# Patient Record
Sex: MALE | Race: ASIAN | NOT HISPANIC OR LATINO | URBAN - METROPOLITAN AREA
[De-identification: names, ages, dates, MRNs, and addresses within clinical notes are randomized per-mention and may not be internally consistent; named-entity substitution may affect disease eponyms.]

---

## 2023-06-10 ENCOUNTER — INPATIENT (INPATIENT)
Facility: HOSPITAL | Age: 43
LOS: 0 days | Discharge: HOME | DRG: 446 | End: 2023-06-11
Attending: HOSPITALIST | Admitting: HOSPITALIST
Payer: COMMERCIAL

## 2023-06-10 VITALS
HEART RATE: 103 BPM | OXYGEN SATURATION: 96 % | HEIGHT: 67 IN | DIASTOLIC BLOOD PRESSURE: 92 MMHG | RESPIRATION RATE: 18 BRPM | SYSTOLIC BLOOD PRESSURE: 144 MMHG | WEIGHT: 184.97 LBS | TEMPERATURE: 99 F

## 2023-06-10 DIAGNOSIS — Z90.49 ACQUIRED ABSENCE OF OTHER SPECIFIED PARTS OF DIGESTIVE TRACT: Chronic | ICD-10-CM

## 2023-06-10 DIAGNOSIS — R10.9 UNSPECIFIED ABDOMINAL PAIN: ICD-10-CM

## 2023-06-10 LAB
ALBUMIN SERPL ELPH-MCNC: 4.1 G/DL — SIGNIFICANT CHANGE UP (ref 3.3–5)
ALP SERPL-CCNC: 244 U/L — HIGH (ref 40–120)
ALT FLD-CCNC: 475 U/L — HIGH (ref 10–45)
ANION GAP SERPL CALC-SCNC: 14 MMOL/L — SIGNIFICANT CHANGE UP (ref 5–17)
AST SERPL-CCNC: 366 U/L — HIGH (ref 10–40)
BASE EXCESS BLDV CALC-SCNC: 2.1 MMOL/L — SIGNIFICANT CHANGE UP (ref -2–3)
BASOPHILS # BLD AUTO: 0.03 K/UL — SIGNIFICANT CHANGE UP (ref 0–0.2)
BASOPHILS NFR BLD AUTO: 0.7 % — SIGNIFICANT CHANGE UP (ref 0–2)
BILIRUB SERPL-MCNC: 1.1 MG/DL — SIGNIFICANT CHANGE UP (ref 0.2–1.2)
BUN SERPL-MCNC: 9 MG/DL — SIGNIFICANT CHANGE UP (ref 7–23)
CA-I SERPL-SCNC: 1.23 MMOL/L — SIGNIFICANT CHANGE UP (ref 1.15–1.33)
CALCIUM SERPL-MCNC: 9.1 MG/DL — SIGNIFICANT CHANGE UP (ref 8.4–10.5)
CHLORIDE BLDV-SCNC: 101 MMOL/L — SIGNIFICANT CHANGE UP (ref 96–108)
CHLORIDE SERPL-SCNC: 102 MMOL/L — SIGNIFICANT CHANGE UP (ref 96–108)
CO2 BLDV-SCNC: 29 MMOL/L — HIGH (ref 22–26)
CO2 SERPL-SCNC: 20 MMOL/L — LOW (ref 22–31)
CREAT SERPL-MCNC: 0.68 MG/DL — SIGNIFICANT CHANGE UP (ref 0.5–1.3)
EGFR: 118 ML/MIN/1.73M2 — SIGNIFICANT CHANGE UP
EOSINOPHIL # BLD AUTO: 0.1 K/UL — SIGNIFICANT CHANGE UP (ref 0–0.5)
EOSINOPHIL NFR BLD AUTO: 2.3 % — SIGNIFICANT CHANGE UP (ref 0–6)
GAS PNL BLDV: 134 MMOL/L — LOW (ref 136–145)
GAS PNL BLDV: SIGNIFICANT CHANGE UP
GLUCOSE BLDC GLUCOMTR-MCNC: 133 MG/DL — HIGH (ref 70–99)
GLUCOSE BLDV-MCNC: 271 MG/DL — HIGH (ref 70–99)
GLUCOSE SERPL-MCNC: 256 MG/DL — HIGH (ref 70–99)
HCO3 BLDV-SCNC: 28 MMOL/L — SIGNIFICANT CHANGE UP (ref 22–29)
HCT VFR BLD CALC: 44.6 % — SIGNIFICANT CHANGE UP (ref 39–50)
HCT VFR BLDA CALC: 48 % — SIGNIFICANT CHANGE UP (ref 39–51)
HGB BLD CALC-MCNC: 16.1 G/DL — SIGNIFICANT CHANGE UP (ref 12.6–17.4)
HGB BLD-MCNC: 15.4 G/DL — SIGNIFICANT CHANGE UP (ref 13–17)
IMM GRANULOCYTES NFR BLD AUTO: 0.2 % — SIGNIFICANT CHANGE UP (ref 0–0.9)
LACTATE BLDV-MCNC: 1.4 MMOL/L — SIGNIFICANT CHANGE UP (ref 0.5–2)
LIDOCAIN IGE QN: 26 U/L — SIGNIFICANT CHANGE UP (ref 7–60)
LYMPHOCYTES # BLD AUTO: 1.56 K/UL — SIGNIFICANT CHANGE UP (ref 1–3.3)
LYMPHOCYTES # BLD AUTO: 35.8 % — SIGNIFICANT CHANGE UP (ref 13–44)
MCHC RBC-ENTMCNC: 28.3 PG — SIGNIFICANT CHANGE UP (ref 27–34)
MCHC RBC-ENTMCNC: 34.5 GM/DL — SIGNIFICANT CHANGE UP (ref 32–36)
MCV RBC AUTO: 81.8 FL — SIGNIFICANT CHANGE UP (ref 80–100)
MONOCYTES # BLD AUTO: 0.38 K/UL — SIGNIFICANT CHANGE UP (ref 0–0.9)
MONOCYTES NFR BLD AUTO: 8.7 % — SIGNIFICANT CHANGE UP (ref 2–14)
NEUTROPHILS # BLD AUTO: 2.28 K/UL — SIGNIFICANT CHANGE UP (ref 1.8–7.4)
NEUTROPHILS NFR BLD AUTO: 52.3 % — SIGNIFICANT CHANGE UP (ref 43–77)
NRBC # BLD: 0 /100 WBCS — SIGNIFICANT CHANGE UP (ref 0–0)
PCO2 BLDV: 46 MMHG — SIGNIFICANT CHANGE UP (ref 42–55)
PH BLDV: 7.39 — SIGNIFICANT CHANGE UP (ref 7.32–7.43)
PLATELET # BLD AUTO: 200 K/UL — SIGNIFICANT CHANGE UP (ref 150–400)
PO2 BLDV: 25 MMHG — SIGNIFICANT CHANGE UP (ref 25–45)
POTASSIUM BLDV-SCNC: 4.2 MMOL/L — SIGNIFICANT CHANGE UP (ref 3.5–5.1)
POTASSIUM SERPL-MCNC: 4.6 MMOL/L — SIGNIFICANT CHANGE UP (ref 3.5–5.3)
POTASSIUM SERPL-SCNC: 4.6 MMOL/L — SIGNIFICANT CHANGE UP (ref 3.5–5.3)
PROT SERPL-MCNC: 7.3 G/DL — SIGNIFICANT CHANGE UP (ref 6–8.3)
RBC # BLD: 5.45 M/UL — SIGNIFICANT CHANGE UP (ref 4.2–5.8)
RBC # FLD: 13.4 % — SIGNIFICANT CHANGE UP (ref 10.3–14.5)
SAO2 % BLDV: 51.1 % — LOW (ref 67–88)
SODIUM SERPL-SCNC: 136 MMOL/L — SIGNIFICANT CHANGE UP (ref 135–145)
WBC # BLD: 4.36 K/UL — SIGNIFICANT CHANGE UP (ref 3.8–10.5)
WBC # FLD AUTO: 4.36 K/UL — SIGNIFICANT CHANGE UP (ref 3.8–10.5)

## 2023-06-10 PROCEDURE — 99222 1ST HOSP IP/OBS MODERATE 55: CPT

## 2023-06-10 PROCEDURE — 78227 HEPATOBIL SYST IMAGE W/DRUG: CPT | Mod: 26,MA

## 2023-06-10 PROCEDURE — 99285 EMERGENCY DEPT VISIT HI MDM: CPT

## 2023-06-10 PROCEDURE — 74177 CT ABD & PELVIS W/CONTRAST: CPT | Mod: 26

## 2023-06-10 PROCEDURE — 76705 ECHO EXAM OF ABDOMEN: CPT | Mod: 26

## 2023-06-10 RX ORDER — SODIUM CHLORIDE 9 MG/ML
1000 INJECTION, SOLUTION INTRAVENOUS
Refills: 0 | Status: DISCONTINUED | OUTPATIENT
Start: 2023-06-10 | End: 2023-06-11

## 2023-06-10 RX ORDER — DEXTROSE 50 % IN WATER 50 %
12.5 SYRINGE (ML) INTRAVENOUS ONCE
Refills: 0 | Status: DISCONTINUED | OUTPATIENT
Start: 2023-06-10 | End: 2023-06-11

## 2023-06-10 RX ORDER — SODIUM CHLORIDE 9 MG/ML
1000 INJECTION INTRAMUSCULAR; INTRAVENOUS; SUBCUTANEOUS ONCE
Refills: 0 | Status: COMPLETED | OUTPATIENT
Start: 2023-06-10 | End: 2023-06-10

## 2023-06-10 RX ORDER — DEXTROSE 50 % IN WATER 50 %
15 SYRINGE (ML) INTRAVENOUS ONCE
Refills: 0 | Status: DISCONTINUED | OUTPATIENT
Start: 2023-06-10 | End: 2023-06-11

## 2023-06-10 RX ORDER — ACETAMINOPHEN 500 MG
1000 TABLET ORAL ONCE
Refills: 0 | Status: COMPLETED | OUTPATIENT
Start: 2023-06-10 | End: 2023-06-10

## 2023-06-10 RX ORDER — INSULIN LISPRO 100/ML
VIAL (ML) SUBCUTANEOUS
Refills: 0 | Status: DISCONTINUED | OUTPATIENT
Start: 2023-06-10 | End: 2023-06-11

## 2023-06-10 RX ORDER — INSULIN LISPRO 100/ML
VIAL (ML) SUBCUTANEOUS AT BEDTIME
Refills: 0 | Status: DISCONTINUED | OUTPATIENT
Start: 2023-06-10 | End: 2023-06-11

## 2023-06-10 RX ORDER — DEXTROSE 50 % IN WATER 50 %
25 SYRINGE (ML) INTRAVENOUS ONCE
Refills: 0 | Status: DISCONTINUED | OUTPATIENT
Start: 2023-06-10 | End: 2023-06-11

## 2023-06-10 RX ORDER — GLUCAGON INJECTION, SOLUTION 0.5 MG/.1ML
1 INJECTION, SOLUTION SUBCUTANEOUS ONCE
Refills: 0 | Status: DISCONTINUED | OUTPATIENT
Start: 2023-06-10 | End: 2023-06-11

## 2023-06-10 RX ORDER — HEPARIN SODIUM 5000 [USP'U]/ML
5000 INJECTION INTRAVENOUS; SUBCUTANEOUS EVERY 8 HOURS
Refills: 0 | Status: DISCONTINUED | OUTPATIENT
Start: 2023-06-10 | End: 2023-06-11

## 2023-06-10 RX ORDER — ONDANSETRON 8 MG/1
4 TABLET, FILM COATED ORAL ONCE
Refills: 0 | Status: COMPLETED | OUTPATIENT
Start: 2023-06-10 | End: 2023-06-10

## 2023-06-10 RX ORDER — ACETAMINOPHEN 500 MG
650 TABLET ORAL EVERY 6 HOURS
Refills: 0 | Status: DISCONTINUED | OUTPATIENT
Start: 2023-06-10 | End: 2023-06-11

## 2023-06-10 RX ADMIN — SODIUM CHLORIDE 1000 MILLILITER(S): 9 INJECTION INTRAMUSCULAR; INTRAVENOUS; SUBCUTANEOUS at 16:43

## 2023-06-10 RX ADMIN — SODIUM CHLORIDE 1000 MILLILITER(S): 9 INJECTION INTRAMUSCULAR; INTRAVENOUS; SUBCUTANEOUS at 17:55

## 2023-06-10 NOTE — H&P ADULT - PROBLEM SELECTOR PLAN 1
- Gallstones and sludge on US, CT  - CT w/ c/f GB wall thickening   - Suspect elevated liver enzymes i/s/o stone that has passed  - Given overall image, will consult surgery for possible cholecystectomy  - Keep NPO  - Trend liver enzymes in AM  - f/u hepatitis panel

## 2023-06-10 NOTE — ED PROVIDER NOTE - PROGRESS NOTE DETAILS
Attending MD Srivastava: US noted, CT ordered, if nonactionable, will consider CDU for HIDA contacted Radiology who will contact the on call [facility] med technician - Sofía Madera PA-C patient and family updated on results thus far and plan - Sofía Madera PA-C Attending MD Srivastava: Discussed risks, benefits of HIDA, explained better test for biliary pathology than CT.  Reports concerned because he had "something injected into his body once" and he felt his heart racing.  Patient amenable to HIDA. Attending MD Srivastava: Spoke with patient's wife, explained results of studies, explained transaminitis, will admit.  Amenable.  Discussed with hospitalist, admitted.

## 2023-06-10 NOTE — H&P ADULT - NSHPLABSRESULTS_GEN_ALL_CORE
LABS:                      15.4   4.36  )-----------( 200      ( 10 Miquel 2023 15:39 )             44.6     06-10    136  |  102  |  9   ----------------------------<  256<H>  4.6   |  20<L>  |  0.68    Ca    9.1      10 Miquel 2023 15:39    TPro  7.3  /  Alb  4.1  /  TBili  1.1  /  DBili  x   /  AST  366<H>  /  ALT  475<H>  /  AlkPhos  244<H>  06-10    LIVER FUNCTIONS - ( 10 Miquel 2023 15:39 )  Alb: 4.1 g/dL / Pro: 7.3 g/dL / ALK PHOS: 244 U/L / ALT: 475 U/L / AST: 366 U/L / GGT: x           IMAGING:  CT Abdomen and Pelvis w/ IV Cont (06.10.23 @ 23:19)  IMPRESSION:  - Mild wall thickening of the gallbladder, without cholelithiasis. Given negative results of both recent sonogram and hepatobiliary scan, these findings are of uncertain nature.    NM Hepatobiliary Imaging w/ RX (06.10.23 @ 00:00)  IMPRESSION:   - Normal morphine-augmented hepatobiliary scan.  - No evidence of acute cholecystitis or biliary obstruction.    US Abdomen Upper Quadrant Right (06.10.23 @ 16:31)  IMPRESSION:  - Gallstones and sludge without evidence of acute cholecystitis.    [X] Imaging personally reviewed by me- GB wall thickening and stone noted  [X] ECG personally reviewed by me- NSR w/out acute ischemic changes

## 2023-06-10 NOTE — H&P ADULT - ASSESSMENT
41yo M w/ PMH of HLD, DM2 pw RUQ abd pain and N/V and elevated liver enzymes. Overall image c/f gallstone disease

## 2023-06-10 NOTE — H&P ADULT - HISTORY OF PRESENT ILLNESS
Pt is a 43yo M w/ PMH of HLD, DM2 pw RUQ abd pain and N/V.    Pain is "burning" and gaslike in nature, intermittent and worse w/ eating. States similar symptoms happened about 1 month prior after eating for which he presented to his PMD and dx w/ gallstones. At that time pt advised to limit fatty food intake and monitor for recurrence of symptoms. On night prior to presentation pt endorses RUQ and epigastric pain a/w N/V similar to prior episode, prompting his ED visit.     Denies any F/C, jaundice, diarrhea, melena, or hematochezia.    In the ED VSS w/ labs notable for elevated liver enzymes and imaging showing gallstones and sludge within the gallbladder and HIDA negative for acute ricardo. He was administered 2L IVF and Zofran. Currently pt is well appearing and asymptomatic.

## 2023-06-10 NOTE — ED ADULT NURSE REASSESSMENT NOTE - NS ED NURSE REASSESS COMMENT FT1
Pt at [facility], as per technician, pt received 4 mg morphine for testing purposes, pre /93, post /87.
Pt is awake, alert, and speaking in full coherent sentences. Vital signs stable. Pt resting comfortably in stretcher, side rails up and bed in lowest position. Pt states his abdominal pain decreased to 2/10 pain. Pt's wife at bedside. Pt awaiting test results.
Pt resting in stretcher, back from nuclear medicine test, awaiting results. Pt denies pain at this time, VSS. Wheels locked, bed in lowest position.

## 2023-06-10 NOTE — H&P ADULT - NSHPPHYSICALEXAM_GEN_ALL_CORE
Vital Signs Last 24 Hrs  T(C): 36.8 (10 Miquel 2023 22:29), Max: 37 (10 Miquel 2023 12:44)  T(F): 98.3 (10 Miquel 2023 22:29), Max: 98.6 (10 Miquel 2023 12:44)  HR: 92 (10 Miquel 2023 22:29) (78 - 103)  BP: 148/94 (10 Miquel 2023 22:29) (144/92 - 155/99)  BP(mean): 109 (10 Miquel 2023 17:38) (109 - 109)  RR: 19 (10 Miquel 2023 22:29) (18 - 20)  SpO2: 98% (10 Miquel 2023 22:29) (96% - 100%)    CONSTITUTIONAL: Well-groomed, in no apparent distress  EYES: No conjunctival or scleral injection, non-icteric;   ENMT: No external nasal lesions; MMM  NECK: Trachea midline without palpable neck mass; thyroid not enlarged and non-tender  RESPIRATORY: Breathing comfortably; no dullness to percussion; lungs CTA without wheeze/rhonchi/rales  CARDIOVASCULAR: +S1S2, RRR, no M/G/R; pedal pulses full and symmetric; no lower extremity edema  GASTROINTESTINAL: No palpable masses or tenderness, +BS throughout, no rebound/guarding; no hepatosplenomegaly; no hernia palpated  LYMPHATIC: No cervical LAD or tenderness  SKIN: No rashes or ulcers noted  NEUROLOGIC: CN II-XII intact; sensation intact in LEs b/l to light touch  PSYCHIATRIC: A&Ox3; mood and affect appropriate; appropriate insight and judgment

## 2023-06-10 NOTE — ED PROVIDER NOTE - OBJECTIVE STATEMENT
42-year-old male hx DM accompanied by family member who translates per their request presenting to the emergency department for the evaluation of waxing and waning epigastric and right upper quadrant pain.  Patient had a similar episode about a month ago and went to see primary care doctor and was diagnosed with gallstones at that time.  Patient had pain controlled and then last night began having sudden onset of nausea and vomiting with return of pain.  No fever or chills.  No diarrhea.  No urinary complaints.  Past surgical history significant for appendectomy many years ago. no etoh. 42-year-old male hx DM accompanied by family member who translates per their request presenting to the emergency department for the evaluation of waxing and waning epigastric and right upper quadrant pain.  Patient had a similar episode about a month ago and went to see primary care doctor and was diagnosed with gallstones at that time.  pain is similar in nature now. worse with eating. Patient had pain controlled and then last night began having sudden onset of nausea and vomiting with return of pain.  No fever or chills.  No diarrhea.  No urinary complaints.  Past surgical history significant for appendectomy many years ago. no etoh.

## 2023-06-10 NOTE — ED ADULT NURSE NOTE - OBJECTIVE STATEMENT
42Y M AXO 3 PMH of gallstones and DM and PSH of appendectomy presented to the ED c/o abdominal pain worsening today. Pt's relative reports pt had a similar episode approx 1 month ago and outpatient provider diagnosed pt with "gallstones" and advised pt to improve on diet. Pt states abdominal pain worsened yesterday and experienced a new onset of nausea and vomiting. Upon arrival to the ED, the pt is well appearing, has bilateral, even and unlabored chest rise, and ambulatory with steady gait. Upon assessment, pt has even and bilateral peripheral pulses, even and bilateral strength, ROM, and soft,, non-distended abdomen. Tenderness noted upon palpation of R and L upper abdominal quadrants and epigastric area. Pt denies fevers, chest pain, SOB, diarrhea, lightheadedness, dizziness, headaches, numbness and tingling of extremities, urinary symptoms, and black or bloody stools. Comfort and safety provided, IV access obtained, bed in lowest position and side rails up.

## 2023-06-10 NOTE — ED PROVIDER NOTE - ATTENDING APP SHARED VISIT CONTRIBUTION OF CARE
Attending MD Srivastava:   I personally have seen and examined this patient.  Physician assistant note reviewed and agree on plan of care and except where noted.  See below for details.     Seen in [place], accompanied by wife, Edwina  Laila [phone]    42M with PMH/PSH including DM, s/p appendectomy (not recent) presents to the ED with upper abdominal pain, RUQ and epigastrium extending now to LUQ.  Reports pain started about a month ago and was evaluated by his PMD and was told that he had gallstones.  Reports that since then has been having intermittent abdominal pain.  Reports that the pain is aggravated by po intake, reports starts about 10-15 min after po intake.  Reports last night had nausea, vomiting, nonbloody, nonbilious.  Denies fevers, chills.  Denies diarrhea, bloody or black stools.  Denies chest pain, shortness of breath.  Denies EtOH.    Exam:   General: NAD  HENT: head NCAT, airway patent  Eyes: anicteric, no conjunctival injection   Lungs: lungs CTAB with good inspiratory effort, no wheezing, no rhonchi, no rales  Cardiac: +S1S2, no obvious m/r/g  GI: abdomen soft with +BS, +epigastric and RUQ tenderness, +Cochran's, ND  : no CVAT  MSK: ranging neck and extremities freely  Neuro: moving all extremities spontaneously, nonfocal  Psych: normal mood and affect     A/P: 42M with RUQ/epigastric pain, suspect biliary pathology, such as cholecystitis, will also consider pancreatitis, less likely gastritis, will obtain RUQ US, labs, if non revelatory, will consider CTAP, HIDA, will give analgesia, keep npo, IVFs, Attending MD Srivastava:   I personally have seen and examined this patient.  Physician assistant note reviewed and agree on plan of care and except where noted.  See below for details.     Seen in [place], accompanied by wife, Edwina  Laila [phone]  Wife did not want , reports she speaks English and would interpret,  remained on line but wife provided interpretation.    42M with PMH/PSH including DM, s/p appendectomy (not recent) presents to the ED with upper abdominal pain, RUQ and epigastrium extending now to LUQ.  Reports pain started about a month ago and was evaluated by his PMD and was told that he had gallstones.  Reports that since then has been having intermittent abdominal pain.  Reports that the pain is aggravated by po intake, reports starts about 10-15 min after po intake.  Reports last night had nausea, vomiting, nonbloody, nonbilious.  Denies fevers, chills.  Denies diarrhea, bloody or black stools.  Denies chest pain, shortness of breath.  Denies EtOH.    Exam:   General: NAD  HENT: head NCAT, airway patent  Eyes: anicteric, no conjunctival injection   Lungs: lungs CTAB with good inspiratory effort, no wheezing, no rhonchi, no rales  Cardiac: +S1S2, no obvious m/r/g  GI: abdomen soft with +BS, +epigastric and RUQ tenderness, +Cochran's, ND  : no CVAT  MSK: ranging neck and extremities freely  Neuro: moving all extremities spontaneously, nonfocal  Psych: normal mood and affect     A/P: 42M with RUQ/epigastric pain, suspect biliary pathology, such as cholecystitis, will also consider pancreatitis, less likely gastritis, will obtain RUQ US, labs, if non revelatory, will consider CTAP, HIDA, will give analgesia, keep npo, IVFs,

## 2023-06-10 NOTE — H&P ADULT - PROBLEM SELECTOR PLAN 2
- On home Insulin 36U qHS and Glimepiride 4mg   - f/u AM A1c  - Start KASH q6hrs while NPO  - Diabetic education  - CC diet when appropriate

## 2023-06-10 NOTE — ED ADULT NURSE NOTE - NSFALLUNIVINTERV_ED_ALL_ED
Bed/Stretcher in lowest position, wheels locked, appropriate side rails in place/Call bell, personal items and telephone in reach/Instruct patient to call for assistance before getting out of bed/chair/stretcher/Non-slip footwear applied when patient is off stretcher/[place] to call system/Physically safe environment - no spills, clutter or unnecessary equipment/Purposeful proactive rounding/Room/bathroom lighting operational, light cord in reach

## 2023-06-10 NOTE — H&P ADULT - NSHPREVIEWOFSYSTEMS_GEN_ALL_CORE
CONSTITUTIONAL: No fever, weight loss  EYES: No eye pain, visual disturbances, or discharge  ENMT: No difficulty hearing, tinnitus, vertigo; No sinus or throat pain  RESPIRATORY: No SOB. No cough, wheezing, chills or hemoptysis  CARDIOVASCULAR: No chest pain, palpitations, dizziness, or leg swelling  GASTROINTESTINAL: No abdominal or epigastric pain. No nausea, vomiting, or hematemesis; No diarrhea or constipation. No melena or hematochezia.  GENITOURINARY: No dysuria, frequency, hematuria, or incontinence  NEUROLOGICAL: No headaches, memory loss, loss of strength, numbness, or tremors  SKIN: No itching, burning, rashes, or lesions   LYMPH NODES: No enlarged glands  ENDOCRINE: No heat or cold intolerance; No hair loss  MUSCULOSKELETAL: No joint pain or swelling; No muscle, back pain  PSYCHIATRIC: No depression, anxiety, mood swings, or difficulty sleeping  HEME/LYMPH: No easy bruising, or bleeding gums

## 2023-06-11 VITALS
SYSTOLIC BLOOD PRESSURE: 149 MMHG | HEART RATE: 98 BPM | DIASTOLIC BLOOD PRESSURE: 97 MMHG | RESPIRATION RATE: 18 BRPM | TEMPERATURE: 98 F | OXYGEN SATURATION: 98 %

## 2023-06-11 DIAGNOSIS — E11.9 TYPE 2 DIABETES MELLITUS WITHOUT COMPLICATIONS: ICD-10-CM

## 2023-06-11 DIAGNOSIS — Z29.9 ENCOUNTER FOR PROPHYLACTIC MEASURES, UNSPECIFIED: ICD-10-CM

## 2023-06-11 DIAGNOSIS — K80.20 CALCULUS OF GALLBLADDER WITHOUT CHOLECYSTITIS WITHOUT OBSTRUCTION: ICD-10-CM

## 2023-06-11 DIAGNOSIS — E78.5 HYPERLIPIDEMIA, UNSPECIFIED: ICD-10-CM

## 2023-06-11 LAB
GLUCOSE BLDC GLUCOMTR-MCNC: 126 MG/DL — HIGH (ref 70–99)
GLUCOSE BLDC GLUCOMTR-MCNC: 245 MG/DL — HIGH (ref 70–99)

## 2023-06-11 PROCEDURE — 99285 EMERGENCY DEPT VISIT HI MDM: CPT

## 2023-06-11 PROCEDURE — 80053 COMPREHEN METABOLIC PANEL: CPT

## 2023-06-11 PROCEDURE — 85018 HEMOGLOBIN: CPT

## 2023-06-11 PROCEDURE — 83605 ASSAY OF LACTIC ACID: CPT

## 2023-06-11 PROCEDURE — 74177 CT ABD & PELVIS W/CONTRAST: CPT | Mod: MA

## 2023-06-11 PROCEDURE — 76705 ECHO EXAM OF ABDOMEN: CPT

## 2023-06-11 PROCEDURE — 85025 COMPLETE CBC W/AUTO DIFF WBC: CPT

## 2023-06-11 PROCEDURE — 78227 HEPATOBIL SYST IMAGE W/DRUG: CPT | Mod: MA

## 2023-06-11 PROCEDURE — 82435 ASSAY OF BLOOD CHLORIDE: CPT

## 2023-06-11 PROCEDURE — 93005 ELECTROCARDIOGRAM TRACING: CPT

## 2023-06-11 PROCEDURE — 82962 GLUCOSE BLOOD TEST: CPT

## 2023-06-11 PROCEDURE — A9537: CPT

## 2023-06-11 PROCEDURE — 82330 ASSAY OF CALCIUM: CPT

## 2023-06-11 PROCEDURE — 85014 HEMATOCRIT: CPT

## 2023-06-11 PROCEDURE — 99233 SBSQ HOSP IP/OBS HIGH 50: CPT

## 2023-06-11 PROCEDURE — 82803 BLOOD GASES ANY COMBINATION: CPT

## 2023-06-11 PROCEDURE — 83690 ASSAY OF LIPASE: CPT

## 2023-06-11 PROCEDURE — 84132 ASSAY OF SERUM POTASSIUM: CPT

## 2023-06-11 PROCEDURE — 82947 ASSAY GLUCOSE BLOOD QUANT: CPT

## 2023-06-11 PROCEDURE — 84295 ASSAY OF SERUM SODIUM: CPT

## 2023-06-11 RX ORDER — SIMVASTATIN 20 MG/1
1 TABLET, FILM COATED ORAL
Refills: 0 | DISCHARGE

## 2023-06-11 RX ORDER — INSULIN GLARGINE 100 [IU]/ML
36 INJECTION, SOLUTION SUBCUTANEOUS
Refills: 0 | DISCHARGE

## 2023-06-11 RX ORDER — SODIUM CHLORIDE 9 MG/ML
1000 INJECTION, SOLUTION INTRAVENOUS
Refills: 0 | Status: DISCONTINUED | OUTPATIENT
Start: 2023-06-11 | End: 2023-06-11

## 2023-06-11 RX ORDER — GLIMEPIRIDE 1 MG
1 TABLET ORAL
Refills: 0 | DISCHARGE

## 2023-06-11 RX ORDER — ATORVASTATIN CALCIUM 80 MG/1
40 TABLET, FILM COATED ORAL AT BEDTIME
Refills: 0 | Status: DISCONTINUED | OUTPATIENT
Start: 2023-06-11 | End: 2023-06-11

## 2023-06-11 RX ORDER — INSULIN LISPRO 100/ML
VIAL (ML) SUBCUTANEOUS EVERY 6 HOURS
Refills: 0 | Status: DISCONTINUED | OUTPATIENT
Start: 2023-06-11 | End: 2023-06-11

## 2023-06-11 RX ADMIN — Medication 2: at 11:47

## 2023-06-11 RX ADMIN — SODIUM CHLORIDE 125 MILLILITER(S): 9 INJECTION, SOLUTION INTRAVENOUS at 11:48

## 2023-06-11 NOTE — CONSULT NOTE ADULT - ASSESSMENT
42M w PMHx DM, HLD, SHx Laparoscopic appendectomy who presents with acute onset RUQ pain aw nausea/vomiting. Surgery consulted for evaluation. Overall picture consistent with sx cholelithiasis given stones/sludge, but benign abd exam and (-) HIDA.     Plan:   -No acute surgical intervention  -Re-Eval in AM, if pain persists will add on for OR  -If pain improves, can dispo per primary team w close follow up with [facility] surgery for interval ricardo   -Surgery to follow     Discussed with Attending Surgeon Dr. Alex Calderon MD PGY2  ACS   [id number]   42M w PMHx DM, HLD, SHx Laparoscopic appendectomy who presents with acute onset RUQ pain aw nausea/vomiting. Surgery consulted for evaluation. Overall picture consistent with sx cholelithiasis given stones/sludge, but benign abd exam and (-) HIDA.     Plan:   -No acute surgical intervention  -Re-Eval in AM, if pain persists will add on for OR  -If pain improves, can dispo per primary team w close follow up with [facility] surgery for interval ricardo   -Surgery to follow     Discussed with Attending Surgeon Dr. Alex Calderon MD PGY2  ACS   [id number]      Addendum:   Patient still presenting abdominal pain. LFTs elevated.  - Please obtain MRCP  - Surgery will follow up

## 2023-06-11 NOTE — PROGRESS NOTE ADULT - SUBJECTIVE AND OBJECTIVE BOX
Bianka Loja MD  Division of Hospital Medicine  Reachable on MS Teams  After hours please page [phone]    PROGRESS NOTE:     Patient is a 42y old  Male who presents with a chief complaint of Abd Pain (11 Jun 2023 12:12)      SUBJECTIVE / OVERNIGHT EVENTS: Pt seen and examined this AM, wife bedside. Patient states that his abdominal pain has resolved and would like to complete his workup outpatient. When pressed, he and his wife stated that he is hungry and doesn't want to wait for MRCP. He reports that he has a good rapport with his primary care doctor who he will see tomorrow. Explained to patient the risks of leaving, he and his wife understand that if he has a stone blocking his CBD he can develop cholecystitis and/or pancreatitis leading to serious infection. Since his pain has resolved, he states he accepts these risks and will follow up with his PMD. Asked patient if we can repeat bloodwork to ensure his LFTs improved however he refused. Given that patient is refusing bloodwork and further workup, patient will be leaving the hospital against medical advice.      ADDITIONAL REVIEW OF SYSTEMS: neg    MEDICATIONS  (STANDING):  atorvastatin 40 milliGRAM(s) Oral at bedtime  dextrose 5% + sodium chloride 0.9%. 1000 milliLiter(s) (125 mL/Hr) IV Continuous <Continuous>  dextrose 5%. 1000 milliLiter(s) (50 mL/Hr) IV Continuous <Continuous>  dextrose 5%. 1000 milliLiter(s) (100 mL/Hr) IV Continuous <Continuous>  dextrose 50% Injectable 25 Gram(s) IV Push once  dextrose 50% Injectable 25 Gram(s) IV Push once  dextrose 50% Injectable 12.5 Gram(s) IV Push once  glucagon  Injectable 1 milliGRAM(s) IntraMuscular once  insulin lispro (ADMELOG) corrective regimen sliding scale   SubCutaneous every 6 hours    MEDICATIONS  (PRN):  acetaminophen     Tablet .. 650 milliGRAM(s) Oral every 6 hours PRN Temp greater or equal to 38C (100.4F), Mild Pain (1 - 3)  dextrose Oral Gel 15 Gram(s) Oral once PRN Blood Glucose LESS THAN 70 milliGRAM(s)/deciliter      CAPILLARY BLOOD GLUCOSE      POCT Blood Glucose.: 245 mg/dL (11 Jun 2023 11:46)  POCT Blood Glucose.: 126 mg/dL (11 Jun 2023 05:54)  POCT Blood Glucose.: 133 mg/dL (10 Miquel 2023 23:25)    I&O's Summary    10 Miquel 2023 07:01  -  11 Jun 2023 07:00  --------------------------------------------------------  IN: 0 mL / OUT: 350 mL / NET: -350 mL    11 Jun 2023 07:01  -  11 Jun 2023 17:04  --------------------------------------------------------  IN: 0 mL / OUT: 0 mL / NET: 0 mL        PHYSICAL EXAM:  Vital Signs Last 24 Hrs  T(C): 36.7 (11 Jun 2023 08:44), Max: 36.8 (10 Miquel 2023 22:29)  T(F): 98 (11 Jun 2023 08:44), Max: 98.3 (10 Miquel 2023 22:29)  HR: 98 (11 Jun 2023 08:44) (78 - 98)  BP: 149/97 (11 Jun 2023 08:44) (129/87 - 150/89)  BP(mean): 109 (10 Miquel 2023 17:38) (109 - 109)  RR: 18 (11 Jun 2023 08:44) (17 - 20)  SpO2: 98% (11 Jun 2023 08:44) (96% - 100%)    Parameters below as of 11 Jun 2023 08:44  Patient On (Oxygen Delivery Method): room air        CONSTITUTIONAL: NAD, well-developed  RESPIRATORY: Normal respiratory effort; lungs are clear to auscultation bilaterally  CARDIOVASCULAR: Regular rate and rhythm, normal S1 and S2, no murmur/rub/gallop; No lower extremity edema; Peripheral pulses are 2+ bilaterally  ABDOMEN: Nontender to deep palpation, normoactive bowel sounds, no rebound/guarding; No hepatosplenomegaly  MUSCLOSKELETAL: no clubbing or cyanosis of digits; no joint swelling or tenderness to palpation  PSYCH: A+O to person, place, and time; affect appropriate    LABS:                        15.4   4.36  )-----------( 200      ( 10 Miquel 2023 15:39 )             44.6     06-10    136  |  102  |  9   ----------------------------<  256<H>  4.6   |  20<L>  |  0.68    Ca    9.1      10 Miquel 2023 15:39    TPro  7.3  /  Alb  4.1  /  TBili  1.1  /  DBili  x   /  AST  366<H>  /  ALT  475<H>  /  AlkPhos  244<H>  06-10                RADIOLOGY & ADDITIONAL TESTS:  Results Reviewed:   Imaging Personally Reviewed:  Electrocardiogram Personally Reviewed:    COORDINATION OF CARE:  Care Discussed with Consultants/Other Providers [Y/N]:  Prior or Outpatient Records Reviewed [Y/N]:

## 2023-06-11 NOTE — PROGRESS NOTE ADULT - PROBLEM SELECTOR PLAN 2
- On home Insulin 36U qHS and Glimepiride 4mg   - f/u AM O8t--dw refusing   - Start KASH q6hrs while NPO  - Diabetic education  - CC diet when appropriate

## 2023-06-11 NOTE — DISCHARGE NOTE NURSING/CASE MANAGEMENT/SOCIAL WORK - PATIENT PORTAL LINK FT
You can access the FollowMyHealth Patient Portal offered by [facility] by registering at the following website: [email or web address]. By joining [facility]’s FollowMyHealth portal, you will also be able to view your health information using other applications (apps) compatible with our system.

## 2023-06-11 NOTE — DISCHARGE NOTE PROVIDER - NSDCMRMEDTOKEN_GEN_ALL_CORE_FT
glimepiride 4 mg oral tablet: 1 tab(s) orally once a day  insulin glargine 100 units/mL subcutaneous solution: 36 unit(s) subcutaneous once a day (at bedtime)  simvastatin 10 mg oral tablet: 1 tab(s) orally once a day

## 2023-06-11 NOTE — DISCHARGE NOTE PROVIDER - CARE PROVIDER_API CALL
Alex Palm Jacobo  Surgery  [address] [place] [id number]  Phone: [phone]  Fax: [phone]  Established Patient  Follow Up Time:

## 2023-06-11 NOTE — DISCHARGE NOTE NURSING/CASE MANAGEMENT/SOCIAL WORK - NSDCPEFALRISK_GEN_ALL_CORE
For information on Fall & Injury Prevention, visit: [email or web address] OR  [email or web address] OR  https://www.cdc.gov/steadi/patient.html

## 2023-06-11 NOTE — CONSULT NOTE ADULT - SUBJECTIVE AND OBJECTIVE BOX
SURGERY CONSULT NOTE  --------------------------------------------------------------------------------------------    Patient is a 42y old  Male who presents with a chief complaint of Abd Pain (10 Miquel 2023 23:56)      HPI: 42M w PMHx DM, HLD, SHx Laparoscopic appendectomy who presents with acute onset RUQ pain       PAST MEDICAL & SURGICAL HISTORY:  Type 2 diabetes mellitus      HLD (hyperlipidemia)      S/P appendectomy        FAMILY HISTORY:  No pertinent family history in first degree relatives    [] Family history not pertinent as reviewed with the patient and family    ALLERGIES: fentanyl (Unknown)    CURRENT MEDICATIONS  MEDICATIONS (STANDING): atorvastatin 40 milliGRAM(s) Oral at bedtime  dextrose 5%. 1000 milliLiter(s) IV Continuous <Continuous>  dextrose 5%. 1000 milliLiter(s) IV Continuous <Continuous>  dextrose 50% Injectable 25 Gram(s) IV Push once  dextrose 50% Injectable 25 Gram(s) IV Push once  dextrose 50% Injectable 12.5 Gram(s) IV Push once  glucagon  Injectable 1 milliGRAM(s) IntraMuscular once  insulin lispro (ADMELOG) corrective regimen sliding scale   SubCutaneous every 6 hours    MEDICATIONS (PRN):acetaminophen     Tablet .. 650 milliGRAM(s) Oral every 6 hours PRN Temp greater or equal to 38C (100.4F), Mild Pain (1 - 3)  dextrose Oral Gel 15 Gram(s) Oral once PRN Blood Glucose LESS THAN 70 milliGRAM(s)/deciliter    --------------------------------------------------------------------------------------------    Vitals:   T(C): 36.2 (06-11-23 @ 04:01), Max: 37 (06-10-23 @ 12:44)  HR: 90 (06-11-23 @ 04:01) (78 - 103)  BP: 144/98 (06-11-23 @ 04:01) (129/87 - 155/99)  RR: 17 (06-11-23 @ 04:01) (17 - 20)  SpO2: 99% (06-11-23 @ 04:01) (96% - 100%)  CAPILLARY BLOOD GLUCOSE      POCT Blood Glucose.: 133 mg/dL (10 Miquel 2023 23:25)    CAPILLARY BLOOD GLUCOSE      POCT Blood Glucose.: 133 mg/dL (10 Miquel 2023 23:25)      Height (cm): 170.2 (06-10 @ 12:44)  Weight (kg): 83.9 (06-10 @ 12:44)  BMI (kg/m2): 29 (06-10 @ 12:44)  BSA (m2): 1.96 (06-10 @ 12:44)    PHYSICAL EXAM: ***  General: Alert, NAD  Neuro: A+Ox3  HEENT: NC/AT, no asymmetry, no scleral icterus  Neck: Soft, supple  Cardio: RRR, nml S1/S2  Resp: Airway patent, unlabored breathing  Thorax: No chest wall tenderness  GI/Abd: Soft, NT/ND, no rebound/guarding, no masses palpated  Vascular: All 4 extremities warm, B/l radial pulses palpable, b/l femoral pulse palpable,  b/l DP/PT palpable  Skin: Intact, no breakdown  Musculoskeletal: All 4 extremities moving spontaneously, no limitations  --------------------------------------------------------------------------------------------    LABS  CBC (06-10 @ 15:39)                              15.4                           4.36    )----------------(  200        52.3  % Neutrophils, 35.8  % Lymphocytes, ANC: 2.28                                44.6      BMP (06-10 @ 15:39)             136     |  102     |  9     		Ca++ --      Ca 9.1                ---------------------------------( 256<H>		Mg --                 4.6     |  20<L>   |  0.68  			Ph --        LFTs (06-10 @ 15:39)      TPro 7.3 / Alb 4.1 / TBili 1.1 / DBili -- / <H> / <H> / AlkPhos 244<H>          VBG (06-10 @ 15:20)     7.39 / 46 / 25 / 28 / 2.1 / 51.1<L>%     Lactate: 1.4    --------------------------------------------------------------------------------------------    MICROBIOLOGY      --------------------------------------------------------------------------------------------    IMAGING   SURGERY CONSULT NOTE  --------------------------------------------------------------------------------------------    Patient is a 42y old  Male who presents with a chief complaint of Abd Pain (10 Miquel 2023 23:56)      HPI: 42M w PMHx DM, HLD, SHx Laparoscopic appendectomy who presents with acute onset RUQ pain aw nausea/vomiting. Surgery consulted for evaluation.           PAST MEDICAL & SURGICAL HISTORY:  Type 2 diabetes mellitus      HLD (hyperlipidemia)      S/P appendectomy        FAMILY HISTORY:  No pertinent family history in first degree relatives    [] Family history not pertinent as reviewed with the patient and family    ALLERGIES: fentanyl (Unknown)    CURRENT MEDICATIONS  MEDICATIONS (STANDING): atorvastatin 40 milliGRAM(s) Oral at bedtime  dextrose 5%. 1000 milliLiter(s) IV Continuous <Continuous>  dextrose 5%. 1000 milliLiter(s) IV Continuous <Continuous>  dextrose 50% Injectable 25 Gram(s) IV Push once  dextrose 50% Injectable 25 Gram(s) IV Push once  dextrose 50% Injectable 12.5 Gram(s) IV Push once  glucagon  Injectable 1 milliGRAM(s) IntraMuscular once  insulin lispro (ADMELOG) corrective regimen sliding scale   SubCutaneous every 6 hours    MEDICATIONS (PRN):acetaminophen     Tablet .. 650 milliGRAM(s) Oral every 6 hours PRN Temp greater or equal to 38C (100.4F), Mild Pain (1 - 3)  dextrose Oral Gel 15 Gram(s) Oral once PRN Blood Glucose LESS THAN 70 milliGRAM(s)/deciliter    --------------------------------------------------------------------------------------------    Vitals:   T(C): 36.2 (06-11-23 @ 04:01), Max: 37 (06-10-23 @ 12:44)  HR: 90 (06-11-23 @ 04:01) (78 - 103)  BP: 144/98 (06-11-23 @ 04:01) (129/87 - 155/99)  RR: 17 (06-11-23 @ 04:01) (17 - 20)  SpO2: 99% (06-11-23 @ 04:01) (96% - 100%)  CAPILLARY BLOOD GLUCOSE      POCT Blood Glucose.: 133 mg/dL (10 Miquel 2023 23:25)    CAPILLARY BLOOD GLUCOSE      POCT Blood Glucose.: 133 mg/dL (10 Miquel 2023 23:25)      Height (cm): 170.2 (06-10 @ 12:44)  Weight (kg): 83.9 (06-10 @ 12:44)  BMI (kg/m2): 29 (06-10 @ 12:44)  BSA (m2): 1.96 (06-10 @ 12:44)    PHYSICAL EXAM: ***  General: Alert, NAD  Neuro: A+Ox3  HEENT: NC/AT, no asymmetry, no scleral icterus  Neck: Soft, supple  Cardio: RRR, nml S1/S2  Resp: Airway patent, unlabored breathing  Thorax: No chest wall tenderness  GI/Abd: Soft, NT/ND, no rebound/guarding, no masses palpated  Vascular: All 4 extremities warm, B/l radial pulses palpable, b/l femoral pulse palpable,  b/l DP/PT palpable  Skin: Intact, no breakdown  Musculoskeletal: All 4 extremities moving spontaneously, no limitations  --------------------------------------------------------------------------------------------    LABS  CBC (06-10 @ 15:39)                              15.4                           4.36    )----------------(  200        52.3  % Neutrophils, 35.8  % Lymphocytes, ANC: 2.28                                44.6      BMP (06-10 @ 15:39)             136     |  102     |  9     		Ca++ --      Ca 9.1                ---------------------------------( 256<H>		Mg --                 4.6     |  20<L>   |  0.68  			Ph --        LFTs (06-10 @ 15:39)      TPro 7.3 / Alb 4.1 / TBili 1.1 / DBili -- / <H> / <H> / AlkPhos 244<H>          VBG (06-10 @ 15:20)     7.39 / 46 / 25 / 28 / 2.1 / 51.1<L>%     Lactate: 1.4    --------------------------------------------------------------------------------------------    MICROBIOLOGY      --------------------------------------------------------------------------------------------    IMAGING   SURGERY CONSULT NOTE  --------------------------------------------------------------------------------------------    Patient is a 42y old  Male who presents with a chief complaint of Abd Pain (10 Miquel 2023 23:56)      HPI: 42M w PMHx DM, HLD, SHx Laparoscopic appendectomy who presents with acute onset RUQ pain aw nausea/vomiting. Surgery consulted for evaluation.     In ED, HDS. Labs notable for transaminitis and elevated alk phos. Otherwise unremarkable. US/CT imaging w cholelithiasis/sludge, w/o evidence of acute cholecystitis. HIDA (-) acute cholecystitis.     Seen and examined. Wife at bedside. Reports abdominal pain x1 month, intermittent. Seen by PCP, who prescribed Abd US, showing cholelithiasis and sludge. Denies CP, SOB, fevers, chills, weight loss, diarrhea, hematemesis, hematochezia, melena.           PAST MEDICAL & SURGICAL HISTORY:  Type 2 diabetes mellitus      HLD (hyperlipidemia)      S/P appendectomy        FAMILY HISTORY:  No pertinent family history in first degree relatives    [] Family history not pertinent as reviewed with the patient and family    ALLERGIES: fentanyl (Unknown)    CURRENT MEDICATIONS  MEDICATIONS (STANDING): atorvastatin 40 milliGRAM(s) Oral at bedtime  dextrose 5%. 1000 milliLiter(s) IV Continuous <Continuous>  dextrose 5%. 1000 milliLiter(s) IV Continuous <Continuous>  dextrose 50% Injectable 25 Gram(s) IV Push once  dextrose 50% Injectable 25 Gram(s) IV Push once  dextrose 50% Injectable 12.5 Gram(s) IV Push once  glucagon  Injectable 1 milliGRAM(s) IntraMuscular once  insulin lispro (ADMELOG) corrective regimen sliding scale   SubCutaneous every 6 hours    MEDICATIONS (PRN):acetaminophen     Tablet .. 650 milliGRAM(s) Oral every 6 hours PRN Temp greater or equal to 38C (100.4F), Mild Pain (1 - 3)  dextrose Oral Gel 15 Gram(s) Oral once PRN Blood Glucose LESS THAN 70 milliGRAM(s)/deciliter    --------------------------------------------------------------------------------------------    Vitals:   T(C): 36.2 (06-11-23 @ 04:01), Max: 37 (06-10-23 @ 12:44)  HR: 90 (06-11-23 @ 04:01) (78 - 103)  BP: 144/98 (06-11-23 @ 04:01) (129/87 - 155/99)  RR: 17 (06-11-23 @ 04:01) (17 - 20)  SpO2: 99% (06-11-23 @ 04:01) (96% - 100%)  CAPILLARY BLOOD GLUCOSE      POCT Blood Glucose.: 133 mg/dL (10 Miquel 2023 23:25)    CAPILLARY BLOOD GLUCOSE      POCT Blood Glucose.: 133 mg/dL (10 Miquel 2023 23:25)      Height (cm): 170.2 (06-10 @ 12:44)  Weight (kg): 83.9 (06-10 @ 12:44)  BMI (kg/m2): 29 (06-10 @ 12:44)  BSA (m2): 1.96 (06-10 @ 12:44)    PHYSICAL EXAM:   General: Alert, NAD  Neuro: A+Ox3  HEENT: NC/AT, no asymmetry, no scleral icterus  Neck: Soft, supple  Cardio: RRR, nml S1/S2  Resp: Airway patent, unlabored breathing  Thorax: No chest wall tenderness  GI/Abd: Soft, NT/ND, no rebound/guarding, no masses palpated  Vascular: All 4 extremities warm   Skin: Intact, no breakdown  Musculoskeletal: All 4 extremities moving spontaneously, no limitations  --------------------------------------------------------------------------------------------    LABS  CBC (06-10 @ 15:39)                              15.4                           4.36    )----------------(  200        52.3  % Neutrophils, 35.8  % Lymphocytes, ANC: 2.28                                44.6      BMP (06-10 @ 15:39)             136     |  102     |  9     		Ca++ --      Ca 9.1                ---------------------------------( 256<H>		Mg --                 4.6     |  20<L>   |  0.68  			Ph --        LFTs (06-10 @ 15:39)      TPro 7.3 / Alb 4.1 / TBili 1.1 / DBili -- / <H> / <H> / AlkPhos 244<H>          VBG (06-10 @ 15:20)     7.39 / 46 / 25 / 28 / 2.1 / 51.1<L>%     Lactate: 1.4    --------------------------------------------------------------------------------------------    MICROBIOLOGY      --------------------------------------------------------------------------------------------    IMAGING

## 2023-06-11 NOTE — PROGRESS NOTE ADULT - PROBLEM SELECTOR PLAN 1
- Gallstones and sludge on US, CT, neg HIDA   - CT w/ c/f GB wall thickening   - Suspect elevated liver enzymes i/s/o stone that has passed--pt refusing repeat labs   - Surg consulted for possible ricardo, requesting MRCP first, pt refusing to stay for MRCP and would like to follow up outpatient  - Trend liver enzymes in AM-refusing  - f/u hepatitis panel-refusing

## 2023-06-11 NOTE — PROGRESS NOTE ADULT - TIME BILLING
chart review, discussion with patient and wife regarding plan, their refusal, and counseling regarding leaving against medical advice, documentation.

## 2023-06-11 NOTE — DISCHARGE NOTE PROVIDER - HOSPITAL COURSE
43yo M w/ PMH of HLD, DM2 pw RUQ abd pain and N/V and elevated liver enzymes. Overall image c/f gallstone disease         Problem/Plan - 1:  ·  Problem: Cholelithiases.   ·  Plan: - Gallstones and sludge on US, CT  - CT w/ c/f GB wall thickening   - Suspect elevated liver enzymes i/s/o stone that has passed  - Given overall image, will consult surgery for possible cholecystectomy  - Keep NPO  - Trend liver enzymes in AM  - f/u hepatitis panel.     Problem/Plan - 2:  ·  Problem: Type 2 diabetes mellitus.   ·  Plan: - On home Insulin 36U qHS and Glimepiride 4mg   - f/u AM A1c  - Start KASH q6hrs while NPO  - Diabetic education  - CC diet when appropriate.     Problem/Plan - 3:  ·  Problem: HLD (hyperlipidemia).   ·  Plan: - Therapeutic interchange to Atorvastatin 40mg qhs from home Simvastatin 20mg qhs.     Problem/Plan - 4:  ·  Problem: Prophylactic measure.   ·  Plan: DVT ppx: SCDs for now pending possible intervention  Diet: NPO      Patient was seen by surgery team and was advised he may need to undergo surgery procedure for cholecystitis if pain does not improve.  Patient states that he feels well and is demanding to leave AMA. Patient was advised and warned about the consequences of leaving AMA.  He said he clearly understands and is aware that he is responsible to follow up with his pmd and repeat labs as outpatient.  Case d/w primary RN and primary physician.     Patient is insisting on leaving AMA, despite knowing all the consequences of doing so. 41yo M w/ PMH of HLD, DM2 pw RUQ abd pain and N/V and elevated liver enzymes. Overall image c/f gallstone disease         Problem/Plan - 1:  ·  Problem: Cholelithiases.   ·  Plan: - Gallstones and sludge on US, CT  - CT w/ c/f GB wall thickening   - Suspect elevated liver enzymes i/s/o stone that has passed  - Given overall image, will consult surgery for possible cholecystectomy--surgery recommending MRCP, pt refusing to stay inpatient to complete, states pain has resolved and would like to follow up outpatient.   - Keep NPO  - Trend liver enzymes in AM--pt refused  - f/u hepatitis panel--pt refused      Problem/Plan - 2:  ·  Problem: Type 2 diabetes mellitus.   ·  Plan: - On home Insulin 36U qHS and Glimepiride 4mg   - f/u AM A1c  - Start KASH q6hrs while NPO  - Diabetic education  - CC diet when appropriate.     Problem/Plan - 3:  ·  Problem: HLD (hyperlipidemia).   ·  Plan: - Therapeutic interchange to Atorvastatin 40mg qhs from home Simvastatin 20mg qhs.     Problem/Plan - 4:  ·  Problem: Prophylactic measure.   ·  Plan: DVT ppx: SCDs for now pending possible intervention  Diet: NPO      Patient was seen by surgery team and was advised he may need to undergo surgery procedure for cholecystitis if pain does not improve. They requested MRCP, which patient refused to wait for. He refused bloodwork to ensure liver enzymes were improving. He reports he will follow up with his PCP tomorrow.   Patient states that he feels well and is demanding to leave AMA. Patient was advised and warned about the consequences of leaving AMA including risk of cholecystitis, pancreatitis, severe sepsis or death.   He said he clearly understands and is aware that he is responsible to follow up with his pmd and repeat labs as outpatient.  Case d/w primary RN and primary physician.     Patient is insisting on leaving AMA, despite knowing all the consequences of doing so.

## 2023-06-11 NOTE — DISCHARGE NOTE PROVIDER - NSDCCPCAREPLAN_GEN_ALL_CORE_FT
PRINCIPAL DISCHARGE DIAGNOSIS  Diagnosis: Cholelithiases  Assessment and Plan of Treatment: You have gallstones and sludge in your gallbladder  This was seen on ultra sound and CT scan of your abdomen  There was wall thickening of your gallbladder.   You have Suspect elevated liver enzymes i/s/o stone that has passed  Gallbladder stones can get worse and cause blockage of the gallbladder.  It may require surgery if not properly treated.  Please followow up with the doctors as outpatient.  Call your doctor or go to the emergency department for severe pain, high fevers.      SECONDARY DISCHARGE DIAGNOSES  Diagnosis: Type 2 diabetes mellitus  Assessment and Plan of Treatment: Continue curent treatment as instructed for your diabetes

## 2023-06-11 NOTE — PROGRESS NOTE ADULT - ASSESSMENT
43yo M w/ PMH of HLD, DM2 pw RUQ abd pain and N/V and elevated liver enzymes. Overall image c/f gallstone disease

## 2023-06-11 NOTE — PROGRESS NOTE ADULT - TIME-BASED BILLING (NON-CRITICAL CARE)
Medicare Wellness Visit  Plan for Preventive Care    A good way for you to stay healthy is to use preventive care.  Medicare covers many services that can help you stay healthy.* The goal of these services is to find any health problems as quickly as possible. Finding problems early can help make them easier to treat.  Your personal plan below lists the services you may need and when they are due.     Health Maintenance Summary     Shingles Vaccine (1 of 2)  Overdue - never done    Medicare Wellness Visit (Yearly)  Overdue since 10/15/2020    DM/CKD Microalbumin (Yearly)  Next due on 9/18/2021    DM/CKD GFR (Yearly)  Ordered on 4/14/2021    Depression Screening (Yearly)  Next due on 4/14/2022    DTaP/Tdap/Td Vaccine (2 - Td)  Next due on 9/13/2022    Pneumococcal Vaccine 65+   Completed    Influenza Vaccine   Completed    COVID-19 Vaccine   Completed    Hepatitis B Vaccine   Aged Out    Meningococcal Vaccine   Aged Out    HPV Vaccine   Aged Out           Preventive Care for Women and Men    Heart Screenings (Cardiovascular):  · Blood tests are used to check your cholesterol, lipid and triglyceride levels. High levels can increase your risk for heart disease and stroke. High levels can be treated with medications, diet and exercise. Lowering your levels can help keep your heart and blood vessels healthy.  Your provider will order these tests if they are needed.    · An ultrasound is done to see if you have an abdominal aortic aneurysm (AAA).  This is an enlargement of one of the main blood vessels that delivers blood to the body.   In the [place], 9,000 deaths are caused by AAA.  You may not even know you have this problem and as many as 1 in 3 people will have a serious problem if it is not treated.  Early diagnosis allows for more effective treatment and cure.  If you have a family history of AAA or are a male age 65-75 who has smoked, you are at higher risk of an AAA.  Your provider can order this test, if  needed.    Colorectal Screening:  · There are many tests that are used to check for cancer of your colon and rectum. You and your provider should discuss what test is best for you and when to have it done.  Options include:  · Screening Colonoscopy: exam of the entire colon, seen through a flexible lighted tube.  · Flexible Sigmoidoscopy: exam of the last third (sigmoid portion) of the colon and rectum, seen through a flexible lighted tube.  · Cologuard DNA stool test: a sample of your stool is used to screen for cancer and unseen blood in your stool.  · Fecal Occult Blood Test: a sample of your stool is studied to find any unseen blood    Flu Shot:  · An immunization that helps to prevent influenza (the flu). You should get this every year. The best time to get the shot is in the fall.    Pneumococcal Shot:  • Vaccines are available that can help prevent pneumococcal disease, which is any type of infection caused by Streptococcus pneumoniae bacteria.   Their use can prevent some cases of pneumonia, meningitis, and sepsis. There are two types of pneumococcal vaccines:   o Conjugate vaccines (PCV-13 or Prevnar 13®) - helps protect against the 13 types of pneumococcal bacteria that are the most common causes of serious infections in children and adults.    o Polysaccharide vaccine (PPSV23 or Ncysfcpfo80®) - helps protect against 23 types of pneumococcal bacteria for patients who are recommended to get it.  These vaccines should be given at least 12 months apart.  A booster is usually not needed.     Hepatitis B Shot:  · An immunization that helps to protect people from getting Hepatitis B. Hepatitis B is a virus that spreads through contact with infected blood or body fluids. Many people with the virus do not have symptoms.  The virus can lead to serious problems, such as liver disease. Some people are at higher risk than others. Your doctor will tell you if you need this shot.     Diabetes Screening:  · A test to  measure sugar (glucose) in your blood is called a fasting blood sugar. Fasting means you cannot have food or drink for at least 8 hours before the test. This test can detect diabetes long before you may notice symptoms.    Glaucoma Screening:  · Glaucoma screening is performed by your eye doctor. The test measures the fluid pressure inside your eyes to determine if you have glaucoma.     Hepatitis C Screening:  · A blood test to see if you have the hepatitis C virus.  Hepatitis C attacks the liver and is a major cause of chronic liver disease.  Medicare will cover a single screening for all adults born between 1945 & 1965, or high risk patients (people who have injected illegal drugs or people who have had blood transfusions).  High risk patients who continue to inject illegal drugs can be screened for Hepatitis C every year.    Smoking and Tobacco-Use Cessation Counseling:  · Tobacco is the single greatest cause of disease and early death in our country today. Medication and counseling together can increase a person’s chance of quitting for good.   · Medicare covers two quitting attempts per year, with four counseling sessions per attempt (eight sessions in a 12 month period)    Preventive Screening tests for Women    Screening Mammograms and Breast Exams:  · An x-ray of your breasts to check for breast cancer before you or your doctor may be able to feel it.  If breast cancer is found early it can usually be treated with success.    Pelvic Exams and Pap Tests:  · An exam to check for cervical and vaginal cancer. A Pap test is a lab test in which cells are taken from your cervix and sent to the lab to look for signs of cervical cancer. If cancer of the cervix is found early, chances for a cure are good. Testing can generally end at age 65, or if a woman has a hysterectomy for a benign condition. Your provider may recommend more frequent testing if certain abnormal results are found.    Bone Mass Measurements:  · A  painless x-ray of your bone density to see if you are at risk for a broken bone. Bone density refers to the thickness of bones or how tightly the bone tissue is packed.    Preventive Screening tests for Men    Prostate Screening:  · Should you have a prostate cancer test (PSA)?  It is up to you to decide if you want a prostate cancer test. Talk to your clinician to find out if the test is right for you.  Things for you to consider and talk about should include:  · Benefits and harms of the test  · Your family history  · How your race/ethnicity may influence the test  · If the test may impact other medical conditions you have  · Your values on screenings and treatments    *Medicare pays for many preventive services to keep you healthy. For some of these services, you might have to pay a deductible, coinsurance, and / or copayment.  The amounts vary depending on the type of services you need and the kind of Medicare health plan you have.              Education Materials    Handouts provided during this visit.  ________________________________________  ТАТЬЯНА instructions provided during this visit.  ________________________________________  Websites discussed during this visit.  ________________________________________  Additional Materials        Time-based billing (NON-critical care)

## 2023-06-12 NOTE — PROVIDER CONTACT NOTE (OTHER) - ACTION/TREATMENT ORDERED:
PA notified and aware. MD Loja @ bedside at this time. Discussed risks with pt, and pt continues to choose to leave AMA. MD notified PA to begin paperwork.

## 2023-06-12 NOTE — PROVIDER CONTACT NOTE (OTHER) - ASSESSMENT
Pt resting in bed. No c/o pain or s&s of distress. All VSS. Team requesting MRCP to evaluate blockage, but pt refusing and states he wants to go home and does not want to wait.

## 2024-09-04 ENCOUNTER — OFFICE (OUTPATIENT)
Facility: LOCATION | Age: 44
Setting detail: OPHTHALMOLOGY
End: 2024-09-04
Payer: COMMERCIAL

## 2024-09-04 DIAGNOSIS — H25.13: ICD-10-CM

## 2024-09-04 DIAGNOSIS — H43.393: ICD-10-CM

## 2024-09-04 DIAGNOSIS — H16.223: ICD-10-CM

## 2024-09-04 DIAGNOSIS — H40.013: ICD-10-CM

## 2024-09-04 PROBLEM — E11.3213: Status: ACTIVE | Noted: 2024-09-04

## 2024-09-04 PROCEDURE — 92202 OPSCPY EXTND ON/MAC DRAW: CPT | Performed by: OPTOMETRIST

## 2024-09-04 PROCEDURE — 92004 COMPRE OPH EXAM NEW PT 1/>: CPT | Performed by: OPTOMETRIST

## 2024-09-04 PROCEDURE — 92134 CPTRZ OPH DX IMG PST SGM RTA: CPT | Performed by: OPTOMETRIST

## 2024-09-04 ASSESSMENT — CONFRONTATIONAL VISUAL FIELD TEST (CVF)
OS_FINDINGS: FULL
OD_FINDINGS: FULL

## 2024-12-18 ENCOUNTER — RX ONLY (RX ONLY)
Age: 44
End: 2024-12-18

## 2024-12-18 ENCOUNTER — OFFICE (OUTPATIENT)
Facility: LOCATION | Age: 44
Setting detail: OPHTHALMOLOGY
End: 2024-12-18
Payer: COMMERCIAL

## 2024-12-18 DIAGNOSIS — H16.223: ICD-10-CM

## 2024-12-18 DIAGNOSIS — H43.393: ICD-10-CM

## 2024-12-18 DIAGNOSIS — H40.013: ICD-10-CM

## 2024-12-18 DIAGNOSIS — H25.13: ICD-10-CM

## 2024-12-18 PROBLEM — E11.9: Status: ACTIVE | Noted: 2024-12-18

## 2024-12-18 PROCEDURE — 92133 CPTRZD OPH DX IMG PST SGM ON: CPT | Performed by: OPTOMETRIST

## 2024-12-18 PROCEDURE — 92012 INTRM OPH EXAM EST PATIENT: CPT | Performed by: OPTOMETRIST

## 2024-12-18 ASSESSMENT — VISUAL ACUITY
OS_BCVA: 20/60+2
OD_BCVA: 20/40-2

## 2024-12-18 ASSESSMENT — REFRACTION_MANIFEST
OS_SPHERE: -0.75
OS_CYLINDER: -1.00
OS_AXIS: 160
OD_SPHERE: -1.00
OS_CYLINDER: -0.50
OD_AXIS: 010
OS_SPHERE: -0.75
OS_AXIS: 165
OD_VA1: 20/20-3
OD_CYLINDER: -0.50
OD_ADD: +1.00
OS_VA1: 20/25+2
OS_ADD: +1.00
OD_CYLINDER: -0.75
OD_SPHERE: -0.75
OD_AXIS: 010

## 2024-12-18 ASSESSMENT — REFRACTION_AUTOREFRACTION
OS_CYLINDER: -1.00
OD_CYLINDER: -0.75
OD_AXIS: 010
OS_AXIS: 0165
OS_SPHERE: -0.75
OD_SPHERE: -1.00

## 2024-12-18 ASSESSMENT — REFRACTION_CURRENTRX
OD_CYLINDER: -0.50
OS_AXIS: 162
OS_OVR_VA: 20/
OD_SPHERE: -0.75
OS_CYLINDER: -0.75
OD_OVR_VA: 20/
OS_SPHERE: -0.75
OD_AXIS: 009

## 2024-12-18 ASSESSMENT — TONOMETRY
OS_IOP_MMHG: 18
OD_IOP_MMHG: 18

## 2024-12-18 ASSESSMENT — KERATOMETRY
OS_AXISANGLE_DEGREES: 065
OD_K2POWER_DIOPTERS: 41.00
OS_K1POWER_DIOPTERS: 40.25
OD_K1POWER_DIOPTERS: 40.50
OS_K2POWER_DIOPTERS: 41.00
OD_AXISANGLE_DEGREES: 130

## 2024-12-18 ASSESSMENT — TEAR BREAK UP TIME (TBUT)
OS_TBUT: 2+
OD_TBUT: 2+